# Patient Record
Sex: FEMALE | Race: BLACK OR AFRICAN AMERICAN | NOT HISPANIC OR LATINO | URBAN - NONMETROPOLITAN AREA
[De-identification: names, ages, dates, MRNs, and addresses within clinical notes are randomized per-mention and may not be internally consistent; named-entity substitution may affect disease eponyms.]

---

## 2019-11-11 ENCOUNTER — IMPORTED ENCOUNTER (OUTPATIENT)
Dept: URBAN - NONMETROPOLITAN AREA CLINIC 1 | Facility: CLINIC | Age: 44
End: 2019-11-11

## 2019-11-11 PROBLEM — H52.4: Noted: 2019-11-11

## 2019-11-11 PROBLEM — H52.223: Noted: 2019-11-11

## 2019-11-11 PROBLEM — H52.03: Noted: 2019-11-11

## 2019-11-11 PROCEDURE — 92015 DETERMINE REFRACTIVE STATE: CPT

## 2019-11-11 PROCEDURE — 92014 COMPRE OPH EXAM EST PT 1/>: CPT

## 2019-11-11 NOTE — PATIENT DISCUSSION
Compound Hyperopic Astigmatism OU w/Presbyopia-  discussed findings w/patient-  new spectacle Rx issued-  continue to monitor yearly or prn; 's Notes: 4yo tawny Anna Peggy. MR 11/11/2019DFE 11/11/2019

## 2020-12-07 ENCOUNTER — IMPORTED ENCOUNTER (OUTPATIENT)
Dept: URBAN - NONMETROPOLITAN AREA CLINIC 1 | Facility: CLINIC | Age: 45
End: 2020-12-07

## 2020-12-07 PROCEDURE — 92015 DETERMINE REFRACTIVE STATE: CPT

## 2020-12-07 PROCEDURE — 92014 COMPRE OPH EXAM EST PT 1/>: CPT

## 2020-12-07 NOTE — PATIENT DISCUSSION
Hyperopia-Discussed diagnosis with patient. Astigmatism-Discussed diagnosis with patient. Presbyopia-Discussed diagnosis with patient. Updated spec Rx given. Recommend lens that will provide comfort as well as protect safety and health of eyes.; 's Notes: 6yo son Demetrius Wolf. MR 11/11/2019DFE 11/11/2019

## 2022-04-09 ASSESSMENT — TONOMETRY
OD_IOP_MMHG: 16
OS_IOP_MMHG: 15
OD_IOP_MMHG: 15
OS_IOP_MMHG: 15

## 2022-04-09 ASSESSMENT — VISUAL ACUITY
OS_CC: 20/20
OD_CC: 20/20
OD_SC: J1
OS_CC: 20/20
OD_CC: 20/20
OS_SC: J1

## 2022-11-28 ENCOUNTER — COMPREHENSIVE EXAM (OUTPATIENT)
Dept: RURAL CLINIC 1 | Facility: CLINIC | Age: 47
End: 2022-11-28

## 2022-11-28 DIAGNOSIS — H52.223: ICD-10-CM

## 2022-11-28 DIAGNOSIS — H52.03: ICD-10-CM

## 2022-11-28 DIAGNOSIS — H52.4: ICD-10-CM

## 2022-11-28 PROCEDURE — 92015 DETERMINE REFRACTIVE STATE: CPT

## 2022-11-28 PROCEDURE — 92014 COMPRE OPH EXAM EST PT 1/>: CPT

## 2022-11-28 ASSESSMENT — VISUAL ACUITY
OD_SC: 20/20-1
OS_SC: 20/20

## 2022-11-28 ASSESSMENT — TONOMETRY
OD_IOP_MMHG: 14
OS_IOP_MMHG: 14

## 2024-02-28 ENCOUNTER — COMPREHENSIVE EXAM (OUTPATIENT)
Dept: RURAL CLINIC 1 | Facility: CLINIC | Age: 49
End: 2024-02-28

## 2024-02-28 DIAGNOSIS — H52.223: ICD-10-CM

## 2024-02-28 DIAGNOSIS — H52.03: ICD-10-CM

## 2024-02-28 DIAGNOSIS — H52.4: ICD-10-CM

## 2024-02-28 PROCEDURE — 92014 COMPRE OPH EXAM EST PT 1/>: CPT

## 2024-02-28 PROCEDURE — 92015 DETERMINE REFRACTIVE STATE: CPT

## 2024-02-28 ASSESSMENT — TONOMETRY
OD_IOP_MMHG: 11
OS_IOP_MMHG: 11

## 2024-02-28 ASSESSMENT — VISUAL ACUITY
OD_SC: 20/20-1
OS_SC: 20/20-1

## 2025-03-04 ENCOUNTER — COMPREHENSIVE EXAM (OUTPATIENT)
Age: 50
End: 2025-03-04

## 2025-03-04 DIAGNOSIS — H52.223: ICD-10-CM

## 2025-03-04 DIAGNOSIS — H52.4: ICD-10-CM

## 2025-03-04 DIAGNOSIS — H52.03: ICD-10-CM

## 2025-03-04 PROCEDURE — 92015 DETERMINE REFRACTIVE STATE: CPT

## 2025-03-04 PROCEDURE — 92014 COMPRE OPH EXAM EST PT 1/>: CPT
